# Patient Record
Sex: MALE | Race: BLACK OR AFRICAN AMERICAN | NOT HISPANIC OR LATINO | URBAN - METROPOLITAN AREA
[De-identification: names, ages, dates, MRNs, and addresses within clinical notes are randomized per-mention and may not be internally consistent; named-entity substitution may affect disease eponyms.]

---

## 2024-04-15 ENCOUNTER — HOSPITAL ENCOUNTER (EMERGENCY)
Facility: HOSPITAL | Age: 24
Discharge: HOME | End: 2024-04-15

## 2024-04-15 VITALS
HEIGHT: 72 IN | TEMPERATURE: 98.4 F | RESPIRATION RATE: 18 BRPM | DIASTOLIC BLOOD PRESSURE: 69 MMHG | OXYGEN SATURATION: 96 % | WEIGHT: 159 LBS | BODY MASS INDEX: 21.54 KG/M2 | HEART RATE: 78 BPM | SYSTOLIC BLOOD PRESSURE: 119 MMHG

## 2024-04-15 DIAGNOSIS — Z71.1 CONCERN ABOUT STD IN MALE WITHOUT DIAGNOSIS: Primary | ICD-10-CM

## 2024-04-15 PROCEDURE — 87800 DETECT AGNT MULT DNA DIREC: CPT | Performed by: PHYSICIAN ASSISTANT

## 2024-04-15 PROCEDURE — 2500000004 HC RX 250 GENERAL PHARMACY W/ HCPCS (ALT 636 FOR OP/ED): Performed by: PHYSICIAN ASSISTANT

## 2024-04-15 PROCEDURE — 87661 TRICHOMONAS VAGINALIS AMPLIF: CPT | Performed by: PHYSICIAN ASSISTANT

## 2024-04-15 PROCEDURE — 2500000001 HC RX 250 WO HCPCS SELF ADMINISTERED DRUGS (ALT 637 FOR MEDICARE OP): Performed by: PHYSICIAN ASSISTANT

## 2024-04-15 PROCEDURE — 96372 THER/PROPH/DIAG INJ SC/IM: CPT | Performed by: PHYSICIAN ASSISTANT

## 2024-04-15 PROCEDURE — 99284 EMERGENCY DEPT VISIT MOD MDM: CPT | Performed by: PHYSICIAN ASSISTANT

## 2024-04-15 PROCEDURE — 99283 EMERGENCY DEPT VISIT LOW MDM: CPT

## 2024-04-15 RX ORDER — METRONIDAZOLE 500 MG/1
2000 TABLET ORAL ONCE
Status: COMPLETED | OUTPATIENT
Start: 2024-04-15 | End: 2024-04-15

## 2024-04-15 RX ORDER — DOXYCYCLINE 100 MG/1
100 TABLET ORAL 2 TIMES DAILY
Qty: 14 TABLET | Refills: 0 | Status: SHIPPED | OUTPATIENT
Start: 2024-04-15 | End: 2024-04-22

## 2024-04-15 RX ORDER — CEFTRIAXONE 500 MG/1
500 INJECTION, POWDER, FOR SOLUTION INTRAMUSCULAR; INTRAVENOUS ONCE
Status: COMPLETED | OUTPATIENT
Start: 2024-04-15 | End: 2024-04-15

## 2024-04-15 RX ORDER — DOXYCYCLINE HYCLATE 100 MG
100 TABLET ORAL ONCE
Status: COMPLETED | OUTPATIENT
Start: 2024-04-15 | End: 2024-04-15

## 2024-04-15 RX ADMIN — DOXYCYCLINE HYCLATE 100 MG: 100 TABLET, COATED ORAL at 18:33

## 2024-04-15 RX ADMIN — METRONIDAZOLE 2000 MG: 500 TABLET ORAL at 18:33

## 2024-04-15 RX ADMIN — CEFTRIAXONE SODIUM 500 MG: 500 INJECTION, POWDER, FOR SOLUTION INTRAMUSCULAR; INTRAVENOUS at 18:33

## 2024-04-15 ASSESSMENT — PAIN DESCRIPTION - LOCATION: LOCATION: PENIS

## 2024-04-15 ASSESSMENT — PAIN - FUNCTIONAL ASSESSMENT: PAIN_FUNCTIONAL_ASSESSMENT: 0-10

## 2024-04-15 ASSESSMENT — COLUMBIA-SUICIDE SEVERITY RATING SCALE - C-SSRS
2. HAVE YOU ACTUALLY HAD ANY THOUGHTS OF KILLING YOURSELF?: NO
6. HAVE YOU EVER DONE ANYTHING, STARTED TO DO ANYTHING, OR PREPARED TO DO ANYTHING TO END YOUR LIFE?: NO
1. IN THE PAST MONTH, HAVE YOU WISHED YOU WERE DEAD OR WISHED YOU COULD GO TO SLEEP AND NOT WAKE UP?: NO

## 2024-04-15 ASSESSMENT — PAIN SCALES - GENERAL: PAINLEVEL_OUTOF10: 8

## 2024-04-15 ASSESSMENT — PAIN DESCRIPTION - PAIN TYPE: TYPE: ACUTE PAIN

## 2024-04-15 NOTE — DISCHARGE INSTRUCTIONS
Take the full course of the antibiotics twice daily for a week.  You will be notified for any positive results.  Increase your fluid intake.  If your symptoms persist follow-up with your PCP, if you need a new 1 call the number listed below.

## 2024-04-15 NOTE — ED PROVIDER NOTES
HPI:  24-year-old otherwise healthy presenting for urinary concerns with concerns for STDs.  States that he has dysuria with urinary frequency for at least the past few weeks, which sounds like could be at least a month.  States his significant other recently told him that she tested positive for chlamydia.  Denies any scrotal pain or discharge from his penis.  Denies any fevers chills night sweats or rigors.  States he otherwise feels well in his normal state of health.    Physical Exam:   GEN: Vitals noted. NAD  EYES:  EOMs grossly intact, anicteric sclera  GEMMA: Mucosa moist.  NECK: Supple.  CARD: RRR  PULMONARY: Moving air well. Clear all lung fields.  ABDOMEN: Soft, no guarding, no rigidity. Nontender. NABS  EXTREMITIES: Full ROM, no pitting edema,   SKIN: Intact, warm and dry  NEURO: Alert and oriented x 3, speech is clear, no obvious deficits noted.   : No lesions or tenderness, no discharge present, no scrotal tenderness, intact cremasterics reflex    ----------------------------------------------------------------------------------------------------------------------------    MDM:  24-year-old male presenting for STD symptoms x 1 month.  On exam he is well-appearing ambulating comfortably.  Vital signs stable.   exam is unremarkable.  Will treat empirically for GC chlamydia and trichomonas.  Will test broadly including HCV HIV and syphilis.  He will be notified for any positive results.  He is educated to practice safe sex.  He is to follow-up with PCP for persistent symptoms.  Return precautions reviewed.    Diagnoses as of 04/15/24 1829   Concern about STD in male without diagnosis     No orders to display     Labs Reviewed   SYPHILIS SCREENING WITH REFLEX   HIV 1/2 ANTIGEN/ANTIBODY SCREEN [facility] REFLEX TO CONFIRMATION   C. TRACHOMATIS + N. GONORRHOEAE, AMPLIFIED   TRICH VAGINALIS, AMPLIFIED   HEPATITIS C ANTIBODY        ----------------------------------------------------------------------------------------------------------------------------    This note was dictated using a speech recognition program.  While an attempt was made at proof reading to minimize errors, minor errors in transcription may be present call for questions.     Tariq Patterson PA-C  04/15/24 5571

## 2024-04-15 NOTE — ED TRIAGE NOTES
"Pt reports to ED for pain to penis when lying down and sometimes during urination. Pt states it started \"2 minutes\" ago. Pt denies any sexual partners having STD's  "

## 2024-04-15 NOTE — Clinical Note
Juan Francisco Brown was seen and treated in our emergency department on 4/15/2024.  He may return to work on 04/16/2024.       If you have any questions or concerns, please don't hesitate to call.      Tariq Patterson PA-C

## 2024-04-16 LAB
C TRACH RRNA SPEC QL NAA+PROBE: NEGATIVE
N GONORRHOEA DNA SPEC QL PROBE+SIG AMP: NEGATIVE
T VAGINALIS RRNA SPEC QL NAA+PROBE: NEGATIVE

## 2024-09-13 ENCOUNTER — HOSPITAL ENCOUNTER (EMERGENCY)
Facility: HOSPITAL | Age: 24
Discharge: HOME | End: 2024-09-13

## 2024-09-13 VITALS
DIASTOLIC BLOOD PRESSURE: 71 MMHG | TEMPERATURE: 98.2 F | OXYGEN SATURATION: 98 % | BODY MASS INDEX: 23.1 KG/M2 | WEIGHT: 165 LBS | HEART RATE: 65 BPM | HEIGHT: 71 IN | SYSTOLIC BLOOD PRESSURE: 107 MMHG

## 2024-09-13 DIAGNOSIS — K08.89 PAIN, DENTAL: Primary | ICD-10-CM

## 2024-09-13 PROCEDURE — 99283 EMERGENCY DEPT VISIT LOW MDM: CPT

## 2024-09-13 PROCEDURE — 2500000002 HC RX 250 W HCPCS SELF ADMINISTERED DRUGS (ALT 637 FOR MEDICARE OP, ALT 636 FOR OP/ED)

## 2024-09-13 PROCEDURE — 96372 THER/PROPH/DIAG INJ SC/IM: CPT

## 2024-09-13 PROCEDURE — 2500000004 HC RX 250 GENERAL PHARMACY W/ HCPCS (ALT 636 FOR OP/ED)

## 2024-09-13 RX ORDER — IBUPROFEN 600 MG/1
600 TABLET ORAL EVERY 6 HOURS PRN
Qty: 28 TABLET | Refills: 0 | Status: SHIPPED | OUTPATIENT
Start: 2024-09-13 | End: 2024-09-20

## 2024-09-13 RX ORDER — PENICILLIN V POTASSIUM 250 MG/1
500 TABLET, FILM COATED ORAL ONCE
Status: COMPLETED | OUTPATIENT
Start: 2024-09-13 | End: 2024-09-13

## 2024-09-13 RX ORDER — KETOROLAC TROMETHAMINE 30 MG/ML
30 INJECTION, SOLUTION INTRAMUSCULAR; INTRAVENOUS ONCE
Status: COMPLETED | OUTPATIENT
Start: 2024-09-13 | End: 2024-09-13

## 2024-09-13 RX ORDER — ACETAMINOPHEN 325 MG/1
975 TABLET ORAL ONCE
Status: COMPLETED | OUTPATIENT
Start: 2024-09-13 | End: 2024-09-13

## 2024-09-13 RX ORDER — PENICILLIN V POTASSIUM 500 MG/1
500 TABLET, FILM COATED ORAL 4 TIMES DAILY
Qty: 28 TABLET | Refills: 0 | Status: SHIPPED | OUTPATIENT
Start: 2024-09-13 | End: 2024-09-20

## 2024-09-13 RX ORDER — ACETAMINOPHEN 500 MG
500 TABLET ORAL EVERY 6 HOURS PRN
Qty: 28 TABLET | Refills: 0 | Status: SHIPPED | OUTPATIENT
Start: 2024-09-13 | End: 2024-09-20

## 2024-09-13 ASSESSMENT — PAIN - FUNCTIONAL ASSESSMENT: PAIN_FUNCTIONAL_ASSESSMENT: 0-10

## 2024-09-13 ASSESSMENT — PAIN SCALES - GENERAL: PAINLEVEL_OUTOF10: 10 - WORST POSSIBLE PAIN

## 2024-09-13 ASSESSMENT — COLUMBIA-SUICIDE SEVERITY RATING SCALE - C-SSRS
6. HAVE YOU EVER DONE ANYTHING, STARTED TO DO ANYTHING, OR PREPARED TO DO ANYTHING TO END YOUR LIFE?: NO
1. IN THE PAST MONTH, HAVE YOU WISHED YOU WERE DEAD OR WISHED YOU COULD GO TO SLEEP AND NOT WAKE UP?: NO
2. HAVE YOU ACTUALLY HAD ANY THOUGHTS OF KILLING YOURSELF?: NO

## 2024-09-13 NOTE — ED PROVIDER NOTES
HPI   Chief Complaint   Patient presents with    Dental Pain   This is a 24-year-old male who denies any significant past medical history who presents to the ED with dental pain.  Patient states that he has had throbbing pains in one of his left, lower, back teeth for the past 5 days.  He states that he believes one of his wisdom teeth may be coming in.  Denies any recent oral injuries.  He denies any throat swelling or difficulty swallowing.  He has not taken anything OTC for his symptoms.     Denies any fevers, chills, headache, dizziness, chest pain or SOB    Limitations to history: None  Independent Historians: Patient  External Records Reviewed: None    Patient History   No past medical history on file.  No past surgical history on file.  No family history on file.  Social History     Tobacco Use    Smoking status: Never    Smokeless tobacco: Never   Substance Use Topics    Alcohol use: Not on file    Drug use: Not on file       Physical Exam   ED Triage Vitals [09/13/24 1416]   Temperature Heart Rate Resp BP   36.8 °C (98.2 °F) 65 -- 107/71      Pulse Ox Temp Source Heart Rate Source Patient Position   98 % Temporal Monitor --      BP Location FiO2 (%)     -- --       Physical Exam  Constitutional:       General: He is not in acute distress.     Appearance: He is not ill-appearing.   HENT:      Head: Normocephalic and atraumatic.      Nose: Nose normal. No congestion or rhinorrhea.      Mouth/Throat:      Mouth: Mucous membranes are moist.      Pharynx: Oropharynx is clear.      Comments: Large dental carry to the left lower molar. Mild erythema to the left lower gumline. No areas of fluctuance or gross dental abscesses. No tonsillitis. No tongue injuries. Midline, nonedematous uvula.  No tenderness to the floor the mouth.   Cardiovascular:      Rate and Rhythm: Normal rate and regular rhythm.      Heart sounds: Normal heart sounds.   Pulmonary:      Effort: Pulmonary effort is normal. No respiratory distress.       Breath sounds: Normal breath sounds.   Abdominal:      General: Bowel sounds are normal.      Palpations: Abdomen is soft.      Tenderness: There is no abdominal tenderness.   Neurological:      Mental Status: He is alert.         ED Course & MDM   Diagnoses as of 09/13/24 1612   Pain, dental         Medical Decision Making  This is a 24-year-old male who denies any significant past medical history who presents to the ED with dental pain.  Patient states that he has had throbbing pains in one of his left, lower, back teeth for the past 5 days. He denies any throat swelling or difficulty swallowing.    Large dental carry to the left lower molar. Mild erythema to the left lower gumline. No areas of fluctuance or gross dental abscesses. No tonsillitis. No tongue injuries. Midline, nonedematous uvula.  No tenderness to the floor the mouth.     Administered Toradol and Tylenol for pain.  On reassessment, patient states his pain is a little better.  Prescribed penicillin for dental infection, administered first dose in the ED. Counseled patient to follow-up with a dentist and provided a list of local dental resources.  Discussed ED return criteria.  Patient verbalized understanding and was agreeable plan of care.  Discharged in stable condition.          Destiny Meek PA-C  09/13/24 1622

## 2024-09-14 ENCOUNTER — HOSPITAL ENCOUNTER (EMERGENCY)
Facility: HOSPITAL | Age: 24
Discharge: HOME | End: 2024-09-14

## 2024-09-14 VITALS
HEART RATE: 85 BPM | OXYGEN SATURATION: 99 % | BODY MASS INDEX: 22.4 KG/M2 | DIASTOLIC BLOOD PRESSURE: 65 MMHG | SYSTOLIC BLOOD PRESSURE: 112 MMHG | HEIGHT: 71 IN | TEMPERATURE: 98.4 F | RESPIRATION RATE: 16 BRPM | WEIGHT: 160 LBS

## 2024-09-14 DIAGNOSIS — R68.89 FLU-LIKE SYMPTOMS: Primary | ICD-10-CM

## 2024-09-14 PROCEDURE — 2500000001 HC RX 250 WO HCPCS SELF ADMINISTERED DRUGS (ALT 637 FOR MEDICARE OP): Performed by: PHYSICIAN ASSISTANT

## 2024-09-14 PROCEDURE — 2500000002 HC RX 250 W HCPCS SELF ADMINISTERED DRUGS (ALT 637 FOR MEDICARE OP, ALT 636 FOR OP/ED): Performed by: PHYSICIAN ASSISTANT

## 2024-09-14 PROCEDURE — 87636 SARSCOV2 & INF A&B AMP PRB: CPT | Performed by: PHYSICIAN ASSISTANT

## 2024-09-14 PROCEDURE — 99283 EMERGENCY DEPT VISIT LOW MDM: CPT

## 2024-09-14 PROCEDURE — 87651 STREP A DNA AMP PROBE: CPT | Performed by: PHYSICIAN ASSISTANT

## 2024-09-14 PROCEDURE — 99284 EMERGENCY DEPT VISIT MOD MDM: CPT | Performed by: PHYSICIAN ASSISTANT

## 2024-09-14 RX ORDER — IBUPROFEN 600 MG/1
600 TABLET ORAL ONCE
Status: COMPLETED | OUTPATIENT
Start: 2024-09-14 | End: 2024-09-14

## 2024-09-14 RX ORDER — ORPHENADRINE CITRATE 100 MG/1
100 TABLET, EXTENDED RELEASE ORAL ONCE
Status: COMPLETED | OUTPATIENT
Start: 2024-09-14 | End: 2024-09-14

## 2024-09-14 RX ORDER — CYCLOBENZAPRINE HCL 10 MG
10 TABLET ORAL 3 TIMES DAILY PRN
Qty: 9 TABLET | Refills: 0 | Status: SHIPPED | OUTPATIENT
Start: 2024-09-14 | End: 2024-09-17

## 2024-09-14 RX ORDER — ACETAMINOPHEN 325 MG/1
975 TABLET ORAL ONCE
Status: COMPLETED | OUTPATIENT
Start: 2024-09-14 | End: 2024-09-14

## 2024-09-14 RX ADMIN — IBUPROFEN 600 MG: 600 TABLET, FILM COATED ORAL at 23:43

## 2024-09-14 RX ADMIN — ORPHENADRINE CITRATE 100 MG: 100 TABLET, EXTENDED RELEASE ORAL at 23:43

## 2024-09-14 RX ADMIN — ACETAMINOPHEN 975 MG: 325 TABLET ORAL at 23:42

## 2024-09-14 ASSESSMENT — PAIN DESCRIPTION - ORIENTATION: ORIENTATION_2: LOWER

## 2024-09-14 ASSESSMENT — PAIN DESCRIPTION - DESCRIPTORS
DESCRIPTORS_2: ACHING
DESCRIPTORS: ACHING

## 2024-09-14 ASSESSMENT — PAIN DESCRIPTION - PAIN TYPE: TYPE: ACUTE PAIN

## 2024-09-14 ASSESSMENT — PAIN DESCRIPTION - ONSET: ONSET: PROGRESSIVE

## 2024-09-14 ASSESSMENT — PAIN DESCRIPTION - LOCATION
LOCATION_2: BACK
LOCATION: HEAD

## 2024-09-14 ASSESSMENT — COLUMBIA-SUICIDE SEVERITY RATING SCALE - C-SSRS
1. IN THE PAST MONTH, HAVE YOU WISHED YOU WERE DEAD OR WISHED YOU COULD GO TO SLEEP AND NOT WAKE UP?: NO
6. HAVE YOU EVER DONE ANYTHING, STARTED TO DO ANYTHING, OR PREPARED TO DO ANYTHING TO END YOUR LIFE?: NO
2. HAVE YOU ACTUALLY HAD ANY THOUGHTS OF KILLING YOURSELF?: NO

## 2024-09-14 ASSESSMENT — PAIN DESCRIPTION - FREQUENCY: FREQUENCY: CONSTANT/CONTINUOUS

## 2024-09-14 ASSESSMENT — PAIN - FUNCTIONAL ASSESSMENT: PAIN_FUNCTIONAL_ASSESSMENT: 0-10

## 2024-09-14 ASSESSMENT — PAIN DESCRIPTION - PROGRESSION: CLINICAL_PROGRESSION: NOT CHANGED

## 2024-09-14 ASSESSMENT — PAIN SCALES - GENERAL
PAINLEVEL_OUTOF10: 10 - WORST POSSIBLE PAIN
PAINLEVEL_OUTOF10: 10 - WORST POSSIBLE PAIN

## 2024-09-14 NOTE — Clinical Note
Juan Francisco Brown was seen and treated in our emergency department on 9/14/2024.  He may return to work on 09/16/2024.       If you have any questions or concerns, please don't hesitate to call.      Irma Forbes PA-C

## 2024-09-15 LAB
FLUAV RNA RESP QL NAA+PROBE: NOT DETECTED
FLUBV RNA RESP QL NAA+PROBE: NOT DETECTED
S PYO DNA THROAT QL NAA+PROBE: NOT DETECTED
SARS-COV-2 RNA RESP QL NAA+PROBE: DETECTED

## 2024-09-15 NOTE — ED TRIAGE NOTES
Pt endorses head/lower back pain starting about 3 hours ago. Pt states he was in the shower when the pain started. Pt denies n/v/d/SOB/CP. Pt states he took a percocet last night for a toothache but denies OTC med use today.

## 2024-09-15 NOTE — ED PROVIDER NOTES
"This is a 24-year-old male with no significant past medical history who presents to the ED with flulike symptoms for the past 1 day.  He does endorse having generalized bodyaches, generalized malaise, sore throat, nasal congestion as well as rhinorrhea.  He denies any chest pain, shortness of breath, or cough.  He is endorsing paresthesias in his bilateral lower extremities but has full sensation.  He has been able to ambulate.  Of note he was seen here yesterday for a toothache and was prescribed Motrin, Tylenol, and ibuprofen he states that he did not get any of these filled because he does not have insurance.  He does have these prescriptions.  He did not go to the pharmacy and did not see if he could afford them without insurance.  He denies any urinary symptoms.  He denies any episodes of bowel or bladder incontinence or saddle anesthesia.  He denies any other symptoms or complaints.      History provided by:  Patient and parent   used: No             Visit Vitals  /65   Pulse 85   Temp 36.9 °C (98.4 °F)   Resp 16   Ht 1.803 m (5' 11\")   Wt 72.6 kg (160 lb)   SpO2 99%   BMI 22.32 kg/m²   Smoking Status Never   BSA 1.91 m²          Physical Exam     Physical exam:   General: Vitals noted, no distress. Afebrile.   EENT:  Hearing grossly intact. Normal phonation. MMM. Airway patient. PERRL. EOMI. broken left lower molar with tenderness palpation over the tooth.  Mild erythema to the posterior oropharynx without exudates or edema.  Uvula midline.  No visible peritonsillar abscess.  Tolerating own secretions.  Neck: No midline tenderness or paraspinal tenderness. FROM.   Cardiac: Regular, rate, rhythm. Normal S1 and S2.  No murmurs, gallops, rubs.   Pulmonary: Good air exchange. Lungs clear bilaterally. No wheezes, rhonchi, rales. No accessory muscle use.   Abdomen: Soft, nonsurgical. Nontender. No peritoneal signs. Normoactive bowel sounds.   Back: No CVA tenderness.  No midline T or " L-spine tenderness palpation.  Bilateral paraspinal lumbar and thoracic tenderness to palpation without any obvious deformity or step-off.    Extremities: No peripheral edema.  Full range of motion. Moves all extremities freely. No tenderness throughout extremities.   Skin: No rash. Warm and Dry.   Neuro: No focal neurologic deficits. CN 2-12 grossly intact. Sensation equal bilaterally, specifically full sensation to bilateral lower extremities. No weakness.         Labs Reviewed   GROUP A STREPTOCOCCUS, PCR   INFLUENZA A AND B PCR   SARS-COV-2 PCR   URINALYSIS WITH REFLEX CULTURE AND MICROSCOPIC    Narrative:     The following orders were created for panel order Urinalysis with Reflex Culture and Microscopic.  Procedure                               Abnormality         Status                     ---------                               -----------         ------                     Urinalysis with Reflex C...[[id number]]                                                 Extra Urine Gray Tube[[id number]]                                                         Please view results for these tests on the individual orders.   URINALYSIS WITH REFLEX CULTURE AND MICROSCOPIC   EXTRA URINE GRAY TUBE       No orders to display           ED Course & MDM     Medical Decision Making  This is a 24-year-old male who presents to the ED with 1 day of flulike symptoms.  Vital stable upon arrival to the ED.  On physical examination he was overall well-appearing.  He was neurologically intact without deficits.  Specifically full strength and sensation to bilateral upper and lower extremities.  No midline C, T, or L-spine tenderness.  No meningismus to his neck.  Abdomen soft and nontender.  No CVA tenderness.  Posterior oropharynx mildly erythematous elect without exudates or edema.  No visualized peritonsillar abscess.  Uvula midline.  Normal phonation.  Tolerating own secretions.  Patient did have a broken left lower molar with mild  tenderness palpation over this tooth.  No areas of fluctuance within the oropharynx that would be amenable to I&D at this time.  Viral swabs as well as strep swab and urinalysis viral swabs and strep swab ordered.  Patient medicated for Motrin, Tylenol, and Norflex.  On my reassessment patient stated that he wanted to be discharged.  He said he would follow-up on his swab results via his [employer].  His mother was present here in the ED with him and felt comfortable taking him home.  She states that she would help him get his prescriptions, specifically his penicillin that he was prescribed yesterday filled.  The patient was given signs and symptoms that he should return to the emergency department with and was discharged from the emergency department in stable condition with strict return precautions.    Amount and/or Complexity of Data Reviewed  External Data Reviewed: labs and notes.  Labs: ordered.    Risk  Prescription drug management.         Diagnoses as of 09/14/24 7600   Flu-like symptoms       Patient was seen independently    Procedures    HIRAM Aceves, EVER Forbes PA-C  09/15/24 0019

## 2024-09-20 ENCOUNTER — TELEPHONE (OUTPATIENT)
Dept: PHARMACY | Facility: HOSPITAL | Age: 24
End: 2024-09-20

## 2024-09-20 NOTE — PROGRESS NOTES
EDPD Note: Rapid Result Review    Reviewed Mr. Juan Francisco Brown 's chart regarding a positive COVID result that was taken during their recent emergency room visit. The patient was not told about these results prior to leaving the emergency department. Therefore, patient was contacted and given proper education.     Patient presented to ED for bodyaches, generalized malaise, sore throat, nasal congestion and rhinorrhea.     No results found for the last 90 days.      No further follow up needed from EDPD Team.     Sharifa Harvey RPh

## 2025-01-17 ENCOUNTER — HOSPITAL ENCOUNTER (EMERGENCY)
Facility: HOSPITAL | Age: 25
Discharge: HOME | End: 2025-01-17

## 2025-01-17 ENCOUNTER — APPOINTMENT (OUTPATIENT)
Dept: RADIOLOGY | Facility: HOSPITAL | Age: 25
End: 2025-01-17

## 2025-01-17 VITALS
TEMPERATURE: 98.8 F | RESPIRATION RATE: 18 BRPM | OXYGEN SATURATION: 96 % | HEIGHT: 72 IN | SYSTOLIC BLOOD PRESSURE: 140 MMHG | BODY MASS INDEX: 21.54 KG/M2 | WEIGHT: 159 LBS | HEART RATE: 72 BPM | DIASTOLIC BLOOD PRESSURE: 85 MMHG

## 2025-01-17 DIAGNOSIS — Z20.2 EXPOSURE TO SEXUALLY TRANSMITTED DISEASE (STD): Primary | ICD-10-CM

## 2025-01-17 DIAGNOSIS — N50.82 SCROTAL PAIN: ICD-10-CM

## 2025-01-17 LAB
APPEARANCE UR: CLEAR
BILIRUB UR STRIP.AUTO-MCNC: NEGATIVE MG/DL
C TRACH RRNA SPEC QL NAA+PROBE: NEGATIVE
COLOR UR: NORMAL
GLUCOSE UR STRIP.AUTO-MCNC: NORMAL MG/DL
HCV AB SER QL: NONREACTIVE
HIV 1+2 AB+HIV1 P24 AG SERPL QL IA: NONREACTIVE
HOLD SPECIMEN: NORMAL
KETONES UR STRIP.AUTO-MCNC: NEGATIVE MG/DL
LEUKOCYTE ESTERASE UR QL STRIP.AUTO: NEGATIVE
N GONORRHOEA DNA SPEC QL PROBE+SIG AMP: NEGATIVE
NITRITE UR QL STRIP.AUTO: NEGATIVE
PH UR STRIP.AUTO: 6 [PH]
PROT UR STRIP.AUTO-MCNC: NEGATIVE MG/DL
RBC # UR STRIP.AUTO: NEGATIVE /UL
SP GR UR STRIP.AUTO: 1.02
TREPONEMA PALLIDUM IGG+IGM AB [PRESENCE] IN SERUM OR PLASMA BY IMMUNOASSAY: NONREACTIVE
UROBILINOGEN UR STRIP.AUTO-MCNC: NORMAL MG/DL

## 2025-01-17 PROCEDURE — 2500000004 HC RX 250 GENERAL PHARMACY W/ HCPCS (ALT 636 FOR OP/ED)

## 2025-01-17 PROCEDURE — 87389 HIV-1 AG W/HIV-1&-2 AB AG IA: CPT

## 2025-01-17 PROCEDURE — 99284 EMERGENCY DEPT VISIT MOD MDM: CPT

## 2025-01-17 PROCEDURE — 76870 US EXAM SCROTUM: CPT | Performed by: STUDENT IN AN ORGANIZED HEALTH CARE EDUCATION/TRAINING PROGRAM

## 2025-01-17 PROCEDURE — 36415 COLL VENOUS BLD VENIPUNCTURE: CPT

## 2025-01-17 PROCEDURE — 2500000001 HC RX 250 WO HCPCS SELF ADMINISTERED DRUGS (ALT 637 FOR MEDICARE OP)

## 2025-01-17 PROCEDURE — 86803 HEPATITIS C AB TEST: CPT

## 2025-01-17 PROCEDURE — 86780 TREPONEMA PALLIDUM: CPT

## 2025-01-17 PROCEDURE — 99284 EMERGENCY DEPT VISIT MOD MDM: CPT | Mod: 25

## 2025-01-17 PROCEDURE — 81003 URINALYSIS AUTO W/O SCOPE: CPT

## 2025-01-17 PROCEDURE — 87491 CHLMYD TRACH DNA AMP PROBE: CPT

## 2025-01-17 PROCEDURE — 93975 VASCULAR STUDY: CPT

## 2025-01-17 PROCEDURE — 96372 THER/PROPH/DIAG INJ SC/IM: CPT

## 2025-01-17 RX ORDER — ACETAMINOPHEN 325 MG/1
975 TABLET ORAL ONCE
Status: COMPLETED | OUTPATIENT
Start: 2025-01-17 | End: 2025-01-17

## 2025-01-17 RX ORDER — AZITHROMYCIN 500 MG/1
1000 TABLET, FILM COATED ORAL ONCE
Status: COMPLETED | OUTPATIENT
Start: 2025-01-17 | End: 2025-01-17

## 2025-01-17 RX ORDER — CEFTRIAXONE 500 MG/1
500 INJECTION, POWDER, FOR SOLUTION INTRAMUSCULAR; INTRAVENOUS ONCE
Status: COMPLETED | OUTPATIENT
Start: 2025-01-17 | End: 2025-01-17

## 2025-01-17 RX ADMIN — AZITHROMYCIN 1000 MG: 500 TABLET, FILM COATED ORAL at 13:00

## 2025-01-17 RX ADMIN — CEFTRIAXONE SODIUM 500 MG: 500 INJECTION, POWDER, FOR SOLUTION INTRAMUSCULAR; INTRAVENOUS at 13:00

## 2025-01-17 RX ADMIN — ACETAMINOPHEN 975 MG: 325 TABLET ORAL at 09:28

## 2025-01-17 ASSESSMENT — PAIN - FUNCTIONAL ASSESSMENT: PAIN_FUNCTIONAL_ASSESSMENT: 0-10

## 2025-01-17 ASSESSMENT — COLUMBIA-SUICIDE SEVERITY RATING SCALE - C-SSRS
6. HAVE YOU EVER DONE ANYTHING, STARTED TO DO ANYTHING, OR PREPARED TO DO ANYTHING TO END YOUR LIFE?: NO
2. HAVE YOU ACTUALLY HAD ANY THOUGHTS OF KILLING YOURSELF?: NO
1. IN THE PAST MONTH, HAVE YOU WISHED YOU WERE DEAD OR WISHED YOU COULD GO TO SLEEP AND NOT WAKE UP?: NO

## 2025-01-17 ASSESSMENT — PAIN DESCRIPTION - LOCATION: LOCATION: SCROTUM

## 2025-01-17 ASSESSMENT — PAIN DESCRIPTION - PAIN TYPE: TYPE: ACUTE PAIN

## 2025-01-17 ASSESSMENT — PAIN DESCRIPTION - DESCRIPTORS: DESCRIPTORS: SHARP

## 2025-01-17 ASSESSMENT — PAIN SCALES - GENERAL: PAINLEVEL_OUTOF10: 10 - WORST POSSIBLE PAIN

## 2025-01-17 NOTE — DISCHARGE INSTRUCTIONS
You were treated for gonorrhea and chlamydia today. You can check mychart for results of those swabs in the next 1-2 days. Practice safe sex practices. Return to ED if symptoms worsen or new symptoms arise

## 2025-01-17 NOTE — ED PROVIDER NOTES
History of Present Illness       History provided by: Patient  Limitations to History: None    HPI:  HPI   This is a 24-year-old male with no significant past medical history presenting with scrotal pain and swelling.  States that pain is constant, however worse after ejaculation.  He reports he believes his scrotum looks uneven but is unsure if one side hung farther down than the other prior to the discomfort starting.  He endorses urinary symptoms including increased urgency and frequency, denies abdominal pain, fevers, chills. Denies lesions or drainage.       Physical Exam   Physical Exam  Constitutional:       General: He is not in acute distress.     Appearance: Normal appearance. He is not ill-appearing.   HENT:      Head: Normocephalic and atraumatic.      Nose: Nose normal.      Mouth/Throat:      Mouth: Mucous membranes are moist.   Eyes:      Extraocular Movements: Extraocular movements intact.      Pupils: Pupils are equal, round, and reactive to light.   Cardiovascular:      Rate and Rhythm: Normal rate and regular rhythm.      Pulses: Normal pulses.      Heart sounds: Normal heart sounds.   Abdominal:      General: There is no distension.      Palpations: Abdomen is soft.      Tenderness: There is no abdominal tenderness. There is no guarding.   Genitourinary:     Penis: Normal.       Testes: Cremasteric reflex is present.         Right: Mass not present.         Left: Tenderness and swelling present. Mass not present.      Epididymis:      Right: Normal.      Left: Normal.   Musculoskeletal:         General: Normal range of motion.      Cervical back: Normal range of motion and neck supple.   Skin:     General: Skin is warm.   Neurological:      General: No focal deficit present.      Mental Status: He is alert.          Triage vitals:  T 37.1 °C (98.8 °F)  HR 72  /85  RR 18  O2 96 % None (Room air)        Medical Decision Making & ED Course     ED Course & MDM       Medical Decision  Making:  Medical Decision Making  This is a 24-year-old male with no significant past medical history presenting to the ED with scrotal pain and swelling for 1 month.  He states pain is worsened with ejaculation but has a dull constant ache.  He states the left side of his scrotum is swollen and hanging lower than normal.  Exam shows an edematous left side of the scrotum, no drainage or lesions.  Cremasteric reflex intact, negative prehn sign.   UA WNL, HIV and Hep C negative.   US scrotum ordered to rule out testicular torsion, acute epididymitis or other acute process. US negative and unremarkable. Patient would like to be empirically treated for STD, given ceftriaxone and azithromycin today in ED. Educated on safe sex practices. Patient agreeable to plan and all questions answered, discharged in stable condition.     Patient was discussed and staffed with Nela Huston CNP       ----    Visit Vitals  /85   Pulse 72   Temp 37.1 °C (98.8 °F) (Temporal)   Resp 18   Ht 1.829 m (6')   Wt 72.1 kg (159 lb)   SpO2 96%   BMI 21.56 kg/m²   Smoking Status Never   BSA 1.91 m²        Labs Reviewed   HIV 1/2 ANTIGEN/ANTIBODY SCREEN WI REFLEX TO CONFIRMATION - Normal       Result Value    HIV 1/2 Antigen/Antibody Screen with Reflex to Confirmation Nonreactive      Narrative:     HIV Ag/Ab screen is performed using the [employer] HIV Ag/Ab Combo assay which detects the presence of HIV p24 antigen as well as antibodies to HIV-1 (Group M and O) and HIV-2.  No laboratory evidence of HIV infection. If acute HIV infection is suspected, consider testing for HIV RNA by PCR (viral load).   HEPATITIS C ANTIBODY - Normal    Hepatitis C AB Nonreactive     SYPHILIS SCREENING WITH REFLEX - Normal    Syphilis Total Ab Nonreactive     URINALYSIS WITH REFLEX CULTURE AND MICROSCOPIC - Normal    Color, Urine Light-Yellow      Appearance, Urine Clear      Specific Gravity, Urine 1.017      pH, Urine 6.0      Protein, Urine NEGATIVE       Glucose, Urine Normal      Blood, Urine NEGATIVE      Ketones, Urine NEGATIVE      Bilirubin, Urine NEGATIVE      Urobilinogen, Urine Normal      Nitrite, Urine NEGATIVE      Leukocyte Esterase, Urine NEGATIVE     C. TRACHOMATIS + N. GONORRHOEAE, AMPLIFIED   TRICHOMONAS WET PREP REFLEX TO PCR   URINALYSIS WITH REFLEX CULTURE AND MICROSCOPIC    Narrative:     The following orders were created for panel order Urinalysis with Reflex Culture and Microscopic.  Procedure                               Abnormality         Status                     ---------                               -----------         ------                     Urinalysis with Reflex C...[[id number]]  Normal              Final result               Extra Urine Gray Tube[[id number]]                            In process                   Please view results for these tests on the individual orders.   EXTRA URINE GRAY TUBE       US scrotum w doppler   Final Result   Unremarkable ultrasound of the scrotum.             I personally reviewed the images/study and I agree with the findings   as stated by Dr. Gt Santillan. This study was interpreted at [place], [place].        MACRO:   None        Signed by: Kris Real 1/17/2025 12:19 PM   Dictation workstation:   [id number]          ED Course:  Diagnoses as of 01/17/25 1305   Exposure to sexually transmitted disease (STD)   Scrotal pain     Disposition   Discharged in stable condition. Given azithromycin and rocephin today empirically. Return precautions given and patient agreeable     Procedures   Procedures    Candida Alonzo PA-C  Emergency Medicine      Candida Alonzo PA-C  01/17/25 1303

## 2025-05-18 ENCOUNTER — HOSPITAL ENCOUNTER (EMERGENCY)
Facility: HOSPITAL | Age: 25
Discharge: LEFT BEFORE DISCHARGE | End: 2025-05-18
Attending: EMERGENCY MEDICINE

## 2025-05-18 VITALS
WEIGHT: 159 LBS | RESPIRATION RATE: 16 BRPM | DIASTOLIC BLOOD PRESSURE: 77 MMHG | OXYGEN SATURATION: 95 % | SYSTOLIC BLOOD PRESSURE: 126 MMHG | HEART RATE: 76 BPM | TEMPERATURE: 98 F | BODY MASS INDEX: 21.56 KG/M2

## 2025-05-18 DIAGNOSIS — A64 STI (SEXUALLY TRANSMITTED INFECTION): Primary | ICD-10-CM

## 2025-05-18 LAB
APPEARANCE UR: CLEAR
BILIRUB UR STRIP.AUTO-MCNC: NEGATIVE MG/DL
CLUE CELLS SPEC QL WET PREP: NORMAL
COLOR UR: ABNORMAL
FLUAV RNA RESP QL NAA+PROBE: NOT DETECTED
FLUBV RNA RESP QL NAA+PROBE: NOT DETECTED
GLUCOSE UR STRIP.AUTO-MCNC: NORMAL MG/DL
HIV 1+2 AB+HIV1 P24 AG SERPL QL IA: NONREACTIVE
KETONES UR STRIP.AUTO-MCNC: ABNORMAL MG/DL
LEUKOCYTE ESTERASE UR QL STRIP.AUTO: ABNORMAL
MUCOUS THREADS #/AREA URNS AUTO: ABNORMAL /LPF
NITRITE UR QL STRIP.AUTO: NEGATIVE
PH UR STRIP.AUTO: 6 [PH]
PROT UR STRIP.AUTO-MCNC: NEGATIVE MG/DL
RBC # UR STRIP.AUTO: NEGATIVE MG/DL
RBC #/AREA URNS AUTO: ABNORMAL /HPF
RSV RNA RESP QL NAA+PROBE: NOT DETECTED
SARS-COV-2 RNA RESP QL NAA+PROBE: NOT DETECTED
SP GR UR STRIP.AUTO: 1.02
T VAGINALIS SPEC QL WET PREP: NORMAL
TRICHOMONAS REFLEX COMMENT: NORMAL
UROBILINOGEN UR STRIP.AUTO-MCNC: NORMAL MG/DL
WBC #/AREA URNS AUTO: >50 /HPF
WBC VAG QL WET PREP: NORMAL
YEAST BUDDING #/AREA UR COMP ASSIST: PRESENT /HPF
YEAST VAG QL WET PREP: NORMAL

## 2025-05-18 PROCEDURE — 87491 CHLMYD TRACH DNA AMP PROBE: CPT

## 2025-05-18 PROCEDURE — 87637 SARSCOV2&INF A&B&RSV AMP PRB: CPT

## 2025-05-18 PROCEDURE — 36415 COLL VENOUS BLD VENIPUNCTURE: CPT

## 2025-05-18 PROCEDURE — 81001 URINALYSIS AUTO W/SCOPE: CPT

## 2025-05-18 PROCEDURE — 99284 EMERGENCY DEPT VISIT MOD MDM: CPT | Performed by: EMERGENCY MEDICINE

## 2025-05-18 PROCEDURE — 86780 TREPONEMA PALLIDUM: CPT

## 2025-05-18 PROCEDURE — 87389 HIV-1 AG W/HIV-1&-2 AB AG IA: CPT

## 2025-05-18 PROCEDURE — 96372 THER/PROPH/DIAG INJ SC/IM: CPT

## 2025-05-18 PROCEDURE — 87086 URINE CULTURE/COLONY COUNT: CPT

## 2025-05-18 PROCEDURE — 87661 TRICHOMONAS VAGINALIS AMPLIF: CPT

## 2025-05-18 PROCEDURE — 2500000004 HC RX 250 GENERAL PHARMACY W/ HCPCS (ALT 636 FOR OP/ED): Mod: JZ

## 2025-05-18 PROCEDURE — 87210 SMEAR WET MOUNT SALINE/INK: CPT

## 2025-05-18 PROCEDURE — 2500000001 HC RX 250 WO HCPCS SELF ADMINISTERED DRUGS (ALT 637 FOR MEDICARE OP)

## 2025-05-18 RX ORDER — METRONIDAZOLE 500 MG/1
2000 TABLET ORAL ONCE
Status: COMPLETED | OUTPATIENT
Start: 2025-05-18 | End: 2025-05-18

## 2025-05-18 RX ORDER — ACETAMINOPHEN 325 MG/1
650 TABLET ORAL EVERY 6 HOURS PRN
Qty: 30 TABLET | Refills: 0 | Status: SHIPPED | OUTPATIENT
Start: 2025-05-18 | End: 2025-05-18

## 2025-05-18 RX ORDER — METRONIDAZOLE 500 MG/1
TABLET ORAL
Status: DISCONTINUED
Start: 2025-05-18 | End: 2025-05-18 | Stop reason: HOSPADM

## 2025-05-18 RX ORDER — DOXYCYCLINE 100 MG/1
100 TABLET ORAL 2 TIMES DAILY
Qty: 20 TABLET | Refills: 0 | Status: SHIPPED | OUTPATIENT
Start: 2025-05-18 | End: 2025-05-18

## 2025-05-18 RX ORDER — DOXYCYCLINE 100 MG/1
100 TABLET ORAL 2 TIMES DAILY
Qty: 20 TABLET | Refills: 0 | Status: SHIPPED | OUTPATIENT
Start: 2025-05-18 | End: 2025-05-28

## 2025-05-18 RX ORDER — CEFTRIAXONE 500 MG/1
500 INJECTION, POWDER, FOR SOLUTION INTRAMUSCULAR; INTRAVENOUS ONCE
Status: COMPLETED | OUTPATIENT
Start: 2025-05-18 | End: 2025-05-18

## 2025-05-18 RX ORDER — ACETAMINOPHEN 325 MG/1
650 TABLET ORAL EVERY 6 HOURS PRN
Qty: 30 TABLET | Refills: 0 | Status: SHIPPED | OUTPATIENT
Start: 2025-05-18

## 2025-05-18 RX ORDER — DOXYCYCLINE HYCLATE 100 MG
100 TABLET ORAL ONCE
Status: COMPLETED | OUTPATIENT
Start: 2025-05-18 | End: 2025-05-18

## 2025-05-18 RX ADMIN — DOXYCYCLINE HYCLATE 100 MG: 100 TABLET, COATED ORAL at 16:52

## 2025-05-18 RX ADMIN — METRONIDAZOLE 2000 MG: 500 TABLET ORAL at 16:52

## 2025-05-18 RX ADMIN — CEFTRIAXONE SODIUM 500 MG: 500 INJECTION, POWDER, FOR SOLUTION INTRAMUSCULAR; INTRAVENOUS at 16:52

## 2025-05-18 NOTE — ED TRIAGE NOTES
Pt presents to the ED c/o body aches, sore throat, and dysuria that all started 4 days ago after his girlfriend told him she tested positive for gonorrhea.

## 2025-05-18 NOTE — DISCHARGE INSTRUCTIONS
You have been evaluated in the Emergency Department today for concerns about STIs. Your evaluation, including exam and history, suggests that your symptoms are due to likely a sexually transmitted infection.  We have empirically treated you and printed you out prescriptions, please take as directed.  We have discharged you with results pending, please follow-up with the results on your MyChart.    Please follow up with your primary care physician within two days. If you do not have a primary care doctor you may call [phone] to make an appointment.    Return to the Emergency Department if you experience difficulty speaking, difficulty eating or drinking. Return to the Emergency Department if you develop any new or worsening symptoms.   Thank you for choosing us for your care.

## 2025-05-18 NOTE — ED PROVIDER NOTES
History of Present Illness     History provided by: Patient  Limitations to History: None  External Records Reviewed: Prior ED visit notes documenting past medical history    HPI:  Juan Francisco Brown is a 25 y.o. male without pertinent past medical history presents to the emergency department for chief complaint of flu symptoms and exposure to STD.  Patient states body aches and sore throat with dysuria starting 4 days ago.  Patient states he has a sexual partner positive for COVID.  Patient denies difficulty breathing, fevers or chills.  Patient states low-grade sore throat but has not had a cough.  Patient denies body aches or pains.  He denies chest pain, belly pain, back pain.    Physical Exam   Triage vitals:  T 36.7 °C (98 °F)  HR 76  /77  RR 16  O2 95 % None (Room air)    Awake alert and oriented x 4, no acute distress resting comfortably in a chair  Moves all 4 extremities without deficit or difficulty, seen to ambulate normally in the department  Speaks in full sentences with no respiratory distress  Has 2+ bilateral radial pulses  Abdomen is soft, nontender, nondistended  He has no conjunctival injection, he has no posterior oropharyngeal erythema edema or edema or exudates, he has no induration of the floor of his mouth and he has no pooling of secretions  He has no inguinal lymphadenopathy, his cremasteric reflexes intact bilaterally, he has purulent drainage from the tip of his penis, he has no lesions or sores on his penis or inguinal area    Medical Decision Making & ED Course   Medical Decision Makin y.o. male hemodynamically stable presenting to the emergency department for flulike symptoms and STI check.  Patient likely has STI, concern for retroviral syndrome.  HIV and syphilis blood test sent.  Patient empirically treated for STIs.  Given counseling regarding safe sex practices.  Patient discharged with results pending.  Prior to being evaluated by the attending physician the patient  left with treatment incomplete.  ----         Social Determinants of Health which Significantly Impact Care: None identified       Chronic conditions affecting the patient's care: See HPI    The patient was discussed with the following consultants/services: Please see ED course for consult transcript        ED Course:  Diagnoses as of 05/18/25 1630   STI (sexually transmitted infection)     Disposition   Left with treatment incomplete    Procedures   Procedures    Patient was seen and discussed with the attending of record.    Julián Ramirez MD  Emergency Medicine     Julián Ramirez MD  Resident  05/18/25 1633       Julián Ramirez MD  Resident  05/18/25 1707       Julián Ramirez MD  Resident  05/18/25 1813

## 2025-05-19 LAB
BACTERIA UR CULT: NO GROWTH
C TRACH RRNA SPEC QL NAA+PROBE: NEGATIVE
HOLD SPECIMEN: NORMAL
N GONORRHOEA DNA SPEC QL PROBE+SIG AMP: POSITIVE
T VAGINALIS RRNA SPEC QL NAA+PROBE: NEGATIVE
TREPONEMA PALLIDUM IGG+IGM AB [PRESENCE] IN SERUM OR PLASMA BY IMMUNOASSAY: NONREACTIVE